# Patient Record
Sex: FEMALE | Race: WHITE
[De-identification: names, ages, dates, MRNs, and addresses within clinical notes are randomized per-mention and may not be internally consistent; named-entity substitution may affect disease eponyms.]

---

## 2019-03-28 ENCOUNTER — HOSPITAL ENCOUNTER (OUTPATIENT)
Dept: HOSPITAL 92 - BICMAMMO | Age: 54
Discharge: HOME | End: 2019-03-28
Attending: INTERNAL MEDICINE
Payer: COMMERCIAL

## 2019-03-28 DIAGNOSIS — Z12.31: Primary | ICD-10-CM

## 2019-03-28 PROCEDURE — 77067 SCR MAMMO BI INCL CAD: CPT

## 2019-03-28 PROCEDURE — 77063 BREAST TOMOSYNTHESIS BI: CPT

## 2019-03-29 NOTE — MMO
Bilateral MAMMO Bilat Screen [facility].

 

CLINICAL HISTORY:

Patient is 53 years old and is seen for screening. The patient has no family

history of breast cancer.  The patient has no personal history of cancer.

 

VIEWS:

The views performed were:  bilateral craniocaudal with tomosynthesis and

bilateral mediolateral oblique with tomosynthesis.

 

FILMS COMPARED:

The present examination has been compared to prior imaging studies performed at

[facility] on 06/13/2012 and 09/17/2013, and at [facility] on 02/17/2017.

 

MAMMOGRAM FINDINGS:

The breasts are heterogeneously dense, which could obscure a lesion on

mammography.

 

Finding 1:  There are stable benign appearing calcifications seen in both

breasts.

 

Finding 2:  There are benign appearing densities seen in both breasts.

 

There are no suspicious masses, suspicious calcifications, or new areas of

architectural distortion.

 

IMPRESSION:

THERE IS NO MAMMOGRAPHIC EVIDENCE OF MALIGNANCY.

 

A ROUTINE FOLLOW-UP MAMMOGRAM IN 1 YEAR IS RECOMMENDED.

 

THE RESULTS OF THIS EXAM WERE SENT TO THE PATIENT.

 

ACR BI-RADS Category 2 - Benign finding

 

MAMMOGRAPHY NOTE:

 1. A negative mammogram report should not delay a biopsy if a dominant of

 clinically suspicious mass is present.

 2. Approximately 10% to 15% of breast cancers are not detected by

 mammography.

 3. Adenosis and dense breasts may obscure an underlying neoplasm.

## 2021-04-13 ENCOUNTER — HOSPITAL ENCOUNTER (OUTPATIENT)
Dept: HOSPITAL 92 - BICMAMMO | Age: 56
Discharge: HOME | End: 2021-04-13
Attending: INTERNAL MEDICINE
Payer: COMMERCIAL

## 2021-04-13 DIAGNOSIS — Z12.31: Primary | ICD-10-CM

## 2021-04-13 PROCEDURE — 77067 SCR MAMMO BI INCL CAD: CPT

## 2021-04-13 PROCEDURE — 77063 BREAST TOMOSYNTHESIS BI: CPT

## 2024-02-15 ENCOUNTER — HOSPITAL ENCOUNTER (OUTPATIENT)
Dept: HOSPITAL 92 - BICMAMMO | Age: 59
Discharge: HOME | End: 2024-02-15
Attending: INTERNAL MEDICINE
Payer: COMMERCIAL

## 2024-02-15 DIAGNOSIS — Z12.31: Primary | ICD-10-CM

## 2024-02-15 PROCEDURE — 77067 SCR MAMMO BI INCL CAD: CPT

## 2024-02-15 PROCEDURE — 77063 BREAST TOMOSYNTHESIS BI: CPT
